# Patient Record
Sex: MALE | Race: WHITE | NOT HISPANIC OR LATINO | Employment: FULL TIME | URBAN - METROPOLITAN AREA
[De-identification: names, ages, dates, MRNs, and addresses within clinical notes are randomized per-mention and may not be internally consistent; named-entity substitution may affect disease eponyms.]

---

## 2017-02-14 ENCOUNTER — LAB (OUTPATIENT)
Dept: LAB | Facility: MEDICAL CENTER | Age: 32
End: 2017-02-14
Payer: COMMERCIAL

## 2017-02-14 ENCOUNTER — TRANSCRIBE ORDERS (OUTPATIENT)
Dept: ADMINISTRATIVE | Facility: HOSPITAL | Age: 32
End: 2017-02-14

## 2017-02-14 DIAGNOSIS — E03.9 HYPOTHYROIDISM, UNSPECIFIED TYPE: ICD-10-CM

## 2017-02-14 DIAGNOSIS — E03.9 HYPOTHYROIDISM, UNSPECIFIED TYPE: Primary | ICD-10-CM

## 2017-02-14 LAB
T3 SERPL-MCNC: 0.8 NG/ML (ref 0.6–1.8)
T4 FREE SERPL-MCNC: 1.21 NG/DL (ref 0.76–1.46)
T4 SERPL-MCNC: 11.2 UG/DL (ref 4.7–13.3)
TSH SERPL DL<=0.05 MIU/L-ACNC: 6.41 UIU/ML (ref 0.36–3.74)

## 2017-02-14 PROCEDURE — 84443 ASSAY THYROID STIM HORMONE: CPT

## 2017-02-14 PROCEDURE — 84480 ASSAY TRIIODOTHYRONINE (T3): CPT

## 2017-02-14 PROCEDURE — 36415 COLL VENOUS BLD VENIPUNCTURE: CPT

## 2017-02-14 PROCEDURE — 84439 ASSAY OF FREE THYROXINE: CPT

## 2017-03-22 ENCOUNTER — TRANSCRIBE ORDERS (OUTPATIENT)
Dept: ADMINISTRATIVE | Facility: HOSPITAL | Age: 32
End: 2017-03-22

## 2017-03-22 ENCOUNTER — APPOINTMENT (OUTPATIENT)
Dept: LAB | Facility: MEDICAL CENTER | Age: 32
End: 2017-03-22
Payer: COMMERCIAL

## 2017-03-22 DIAGNOSIS — E03.9 UNSPECIFIED HYPOTHYROIDISM: Primary | ICD-10-CM

## 2017-03-22 DIAGNOSIS — E10.21 DIABETIC NEPHROPATHY, TYPE I (HCC): ICD-10-CM

## 2017-03-22 DIAGNOSIS — E10.319: Primary | ICD-10-CM

## 2017-03-22 DIAGNOSIS — E10.319: ICD-10-CM

## 2017-03-22 DIAGNOSIS — E03.9 UNSPECIFIED HYPOTHYROIDISM: ICD-10-CM

## 2017-03-22 LAB
ALBUMIN SERPL BCP-MCNC: 3.9 G/DL (ref 3.5–5)
ALP SERPL-CCNC: 67 U/L (ref 46–116)
ALT SERPL W P-5'-P-CCNC: 81 U/L (ref 12–78)
ANION GAP SERPL CALCULATED.3IONS-SCNC: 6 MMOL/L (ref 4–13)
AST SERPL W P-5'-P-CCNC: 29 U/L (ref 5–45)
BASOPHILS # BLD AUTO: 0.03 THOUSANDS/ΜL (ref 0–0.1)
BASOPHILS NFR BLD AUTO: 0 % (ref 0–1)
BILIRUB SERPL-MCNC: 1.1 MG/DL (ref 0.2–1)
BUN SERPL-MCNC: 26 MG/DL (ref 5–25)
CALCIUM SERPL-MCNC: 9.5 MG/DL (ref 8.3–10.1)
CHLORIDE SERPL-SCNC: 106 MMOL/L (ref 100–108)
CO2 SERPL-SCNC: 30 MMOL/L (ref 21–32)
CREAT SERPL-MCNC: 1.05 MG/DL (ref 0.6–1.3)
CREAT UR-MCNC: 104 MG/DL
EOSINOPHIL # BLD AUTO: 0.17 THOUSAND/ΜL (ref 0–0.61)
EOSINOPHIL NFR BLD AUTO: 2 % (ref 0–6)
ERYTHROCYTE [DISTWIDTH] IN BLOOD BY AUTOMATED COUNT: 13.1 % (ref 11.6–15.1)
GFR SERPL CREATININE-BSD FRML MDRD: >60 ML/MIN/1.73SQ M
GLUCOSE P FAST SERPL-MCNC: 135 MG/DL (ref 65–99)
HCT VFR BLD AUTO: 40.8 % (ref 36.5–49.3)
HGB BLD-MCNC: 13.7 G/DL (ref 12–17)
LYMPHOCYTES # BLD AUTO: 2.42 THOUSANDS/ΜL (ref 0.6–4.47)
LYMPHOCYTES NFR BLD AUTO: 30 % (ref 14–44)
MAGNESIUM SERPL-MCNC: 2.4 MG/DL (ref 1.6–2.6)
MCH RBC QN AUTO: 29.8 PG (ref 26.8–34.3)
MCHC RBC AUTO-ENTMCNC: 33.6 G/DL (ref 31.4–37.4)
MCV RBC AUTO: 89 FL (ref 82–98)
MICROALBUMIN UR-MCNC: 94 MG/L (ref 0–20)
MICROALBUMIN/CREAT 24H UR: 90 MG/G CREATININE (ref 0–30)
MONOCYTES # BLD AUTO: 0.56 THOUSAND/ΜL (ref 0.17–1.22)
MONOCYTES NFR BLD AUTO: 7 % (ref 4–12)
NEUTROPHILS # BLD AUTO: 5 THOUSANDS/ΜL (ref 1.85–7.62)
NEUTS SEG NFR BLD AUTO: 61 % (ref 43–75)
NRBC BLD AUTO-RTO: 0 /100 WBCS
PHOSPHATE SERPL-MCNC: 3.2 MG/DL (ref 2.7–4.5)
PLATELET # BLD AUTO: 230 THOUSANDS/UL (ref 149–390)
PMV BLD AUTO: 11.8 FL (ref 8.9–12.7)
POTASSIUM SERPL-SCNC: 4.3 MMOL/L (ref 3.5–5.3)
PROT SERPL-MCNC: 7.7 G/DL (ref 6.4–8.2)
RBC # BLD AUTO: 4.59 MILLION/UL (ref 3.88–5.62)
SODIUM SERPL-SCNC: 142 MMOL/L (ref 136–145)
T4 FREE SERPL-MCNC: 1.49 NG/DL (ref 0.76–1.46)
TSH SERPL DL<=0.05 MIU/L-ACNC: 0.34 UIU/ML (ref 0.36–3.74)
URATE SERPL-MCNC: 5.4 MG/DL (ref 4.2–8)
WBC # BLD AUTO: 8.2 THOUSAND/UL (ref 4.31–10.16)

## 2017-03-22 PROCEDURE — 82043 UR ALBUMIN QUANTITATIVE: CPT | Performed by: INTERNAL MEDICINE

## 2017-03-22 PROCEDURE — 85025 COMPLETE CBC W/AUTO DIFF WBC: CPT

## 2017-03-22 PROCEDURE — 84443 ASSAY THYROID STIM HORMONE: CPT

## 2017-03-22 PROCEDURE — 83735 ASSAY OF MAGNESIUM: CPT

## 2017-03-22 PROCEDURE — 84100 ASSAY OF PHOSPHORUS: CPT

## 2017-03-22 PROCEDURE — 80053 COMPREHEN METABOLIC PANEL: CPT

## 2017-03-22 PROCEDURE — 84439 ASSAY OF FREE THYROXINE: CPT

## 2017-03-22 PROCEDURE — 84550 ASSAY OF BLOOD/URIC ACID: CPT

## 2017-03-22 PROCEDURE — 36415 COLL VENOUS BLD VENIPUNCTURE: CPT

## 2017-03-22 PROCEDURE — 82570 ASSAY OF URINE CREATININE: CPT | Performed by: INTERNAL MEDICINE

## 2018-09-16 ENCOUNTER — HOSPITAL ENCOUNTER (INPATIENT)
Facility: HOSPITAL | Age: 33
LOS: 1 days | Discharge: HOME | DRG: 919 | End: 2018-09-17
Attending: EMERGENCY MEDICINE | Admitting: INTERNAL MEDICINE
Payer: COMMERCIAL

## 2018-09-16 ENCOUNTER — APPOINTMENT (EMERGENCY)
Dept: RADIOLOGY | Facility: HOSPITAL | Age: 33
DRG: 919 | End: 2018-09-16
Payer: COMMERCIAL

## 2018-09-16 DIAGNOSIS — E87.2 METABOLIC ACIDOSIS, INCREASED ANION GAP: ICD-10-CM

## 2018-09-16 DIAGNOSIS — E11.10 DKA (DIABETIC KETOACIDOSES): Primary | ICD-10-CM

## 2018-09-16 DIAGNOSIS — N17.9 AKI (ACUTE KIDNEY INJURY) (HCC): ICD-10-CM

## 2018-09-16 PROBLEM — E03.9 HYPOTHYROID: Status: ACTIVE | Noted: 2018-09-16

## 2018-09-16 LAB
ACETONE SERPL-MCNC: ABNORMAL MG/DL
ALBUMIN SERPL BCP-MCNC: 3.8 G/DL (ref 3.5–5)
ALBUMIN SERPL BCP-MCNC: 4.6 G/DL (ref 3.5–5)
ALP SERPL-CCNC: 67 U/L (ref 46–116)
ALP SERPL-CCNC: 75 U/L (ref 46–116)
ALT SERPL W P-5'-P-CCNC: 24 U/L (ref 12–78)
ALT SERPL W P-5'-P-CCNC: 26 U/L (ref 12–78)
AMPHETAMINES SERPL QL SCN: NEGATIVE
ANION GAP SERPL CALCULATED.3IONS-SCNC: 13 MMOL/L (ref 4–13)
ANION GAP SERPL CALCULATED.3IONS-SCNC: 22 MMOL/L (ref 4–13)
APTT PPP: 31 SECONDS (ref 24–36)
AST SERPL W P-5'-P-CCNC: 18 U/L (ref 5–45)
AST SERPL W P-5'-P-CCNC: 21 U/L (ref 5–45)
ATRIAL RATE: 112 BPM
BACTERIA UR QL AUTO: ABNORMAL /HPF
BARBITURATES UR QL: NEGATIVE
BASE EX.OXY STD BLDV CALC-SCNC: 90 % (ref 60–80)
BASE EXCESS BLDV CALC-SCNC: -9 MMOL/L
BASOPHILS # BLD AUTO: 0.07 THOUSANDS/ΜL (ref 0–0.1)
BASOPHILS NFR BLD AUTO: 1 % (ref 0–1)
BENZODIAZ UR QL: NEGATIVE
BILIRUB SERPL-MCNC: 1.62 MG/DL (ref 0.2–1)
BILIRUB SERPL-MCNC: 2.12 MG/DL (ref 0.2–1)
BILIRUB UR QL STRIP: NEGATIVE
BUN SERPL-MCNC: 29 MG/DL (ref 5–25)
BUN SERPL-MCNC: 36 MG/DL (ref 5–25)
CALCIUM SERPL-MCNC: 8.5 MG/DL (ref 8.3–10.1)
CALCIUM SERPL-MCNC: 9.9 MG/DL (ref 8.3–10.1)
CHLORIDE SERPL-SCNC: 103 MMOL/L (ref 100–108)
CHLORIDE SERPL-SCNC: 90 MMOL/L (ref 100–108)
CLARITY UR: CLEAR
CO2 SERPL-SCNC: 19 MMOL/L (ref 21–32)
CO2 SERPL-SCNC: 22 MMOL/L (ref 21–32)
COCAINE UR QL: NEGATIVE
COLOR UR: YELLOW
COLOR, POC: YELLOW
CREAT SERPL-MCNC: 1.53 MG/DL (ref 0.6–1.3)
CREAT SERPL-MCNC: 1.9 MG/DL (ref 0.6–1.3)
EOSINOPHIL # BLD AUTO: 0.19 THOUSAND/ΜL (ref 0–0.61)
EOSINOPHIL NFR BLD AUTO: 2 % (ref 0–6)
ERYTHROCYTE [DISTWIDTH] IN BLOOD BY AUTOMATED COUNT: 13.1 % (ref 11.6–15.1)
GFR SERPL CREATININE-BSD FRML MDRD: 46 ML/MIN/1.73SQ M
GFR SERPL CREATININE-BSD FRML MDRD: 59 ML/MIN/1.73SQ M
GLUCOSE SERPL-MCNC: 115 MG/DL (ref 65–140)
GLUCOSE SERPL-MCNC: 123 MG/DL (ref 65–140)
GLUCOSE SERPL-MCNC: 130 MG/DL (ref 65–140)
GLUCOSE SERPL-MCNC: 195 MG/DL (ref 65–140)
GLUCOSE SERPL-MCNC: 223 MG/DL (ref 65–140)
GLUCOSE SERPL-MCNC: 245 MG/DL (ref 65–140)
GLUCOSE SERPL-MCNC: 297 MG/DL (ref 65–140)
GLUCOSE SERPL-MCNC: 312 MG/DL (ref 65–140)
GLUCOSE SERPL-MCNC: 356 MG/DL (ref 65–140)
GLUCOSE SERPL-MCNC: 442 MG/DL (ref 65–140)
GLUCOSE SERPL-MCNC: 588 MG/DL (ref 65–140)
GLUCOSE SERPL-MCNC: >500 MG/DL (ref 65–140)
GLUCOSE SERPL-MCNC: >500 MG/DL (ref 65–140)
GLUCOSE UR STRIP-MCNC: ABNORMAL MG/DL
HCO3 BLDV-SCNC: 16.1 MMOL/L (ref 24–30)
HCT VFR BLD AUTO: 41.2 % (ref 36.5–49.3)
HGB BLD-MCNC: 13.2 G/DL (ref 12–17)
HGB UR QL STRIP.AUTO: ABNORMAL
IMM GRANULOCYTES # BLD AUTO: 0.04 THOUSAND/UL (ref 0–0.2)
IMM GRANULOCYTES NFR BLD AUTO: 0 % (ref 0–2)
INR PPP: 1.03 (ref 0.86–1.17)
KETONES UR STRIP-MCNC: ABNORMAL MG/DL
LEUKOCYTE ESTERASE UR QL STRIP: NEGATIVE
LYMPHOCYTES # BLD AUTO: 2.08 THOUSANDS/ΜL (ref 0.6–4.47)
LYMPHOCYTES NFR BLD AUTO: 18 % (ref 14–44)
MAGNESIUM SERPL-MCNC: 2.2 MG/DL (ref 1.6–2.6)
MAGNESIUM SERPL-MCNC: 2.4 MG/DL (ref 1.6–2.6)
MCH RBC QN AUTO: 30 PG (ref 26.8–34.3)
MCHC RBC AUTO-ENTMCNC: 32 G/DL (ref 31.4–37.4)
MCV RBC AUTO: 94 FL (ref 82–98)
METHADONE UR QL: NEGATIVE
MONOCYTES # BLD AUTO: 0.83 THOUSAND/ΜL (ref 0.17–1.22)
MONOCYTES NFR BLD AUTO: 7 % (ref 4–12)
MUCOUS THREADS UR QL AUTO: ABNORMAL
NEUTROPHILS # BLD AUTO: 8.61 THOUSANDS/ΜL (ref 1.85–7.62)
NEUTS SEG NFR BLD AUTO: 72 % (ref 43–75)
NITRITE UR QL STRIP: NEGATIVE
NON-SQ EPI CELLS URNS QL MICRO: ABNORMAL /HPF
NRBC BLD AUTO-RTO: 0 /100 WBCS
O2 CT BLDV-SCNC: 17.7 ML/DL
OPIATES UR QL SCN: NEGATIVE
P AXIS: 72 DEGREES
PCO2 BLDV: 32.7 MM HG (ref 42–50)
PCP UR QL: NEGATIVE
PH BLDV: 7.31 [PH] (ref 7.3–7.4)
PH UR STRIP.AUTO: 5.5 [PH] (ref 4.5–8)
PHOSPHATE SERPL-MCNC: 5.9 MG/DL (ref 2.7–4.5)
PLATELET # BLD AUTO: 209 THOUSANDS/UL (ref 149–390)
PLATELET # BLD AUTO: 220 THOUSANDS/UL (ref 149–390)
PMV BLD AUTO: 10.5 FL (ref 8.9–12.7)
PMV BLD AUTO: 11.8 FL (ref 8.9–12.7)
PO2 BLDV: 64.5 MM HG (ref 35–45)
POTASSIUM SERPL-SCNC: 3.8 MMOL/L (ref 3.5–5.3)
POTASSIUM SERPL-SCNC: 5 MMOL/L (ref 3.5–5.3)
PR INTERVAL: 130 MS
PROT SERPL-MCNC: 7.3 G/DL (ref 6.4–8.2)
PROT SERPL-MCNC: 8.3 G/DL (ref 6.4–8.2)
PROT UR STRIP-MCNC: NEGATIVE MG/DL
PROTHROMBIN TIME: 13.6 SECONDS (ref 11.8–14.2)
QRS AXIS: -24 DEGREES
QRSD INTERVAL: 100 MS
QT INTERVAL: 342 MS
QTC INTERVAL: 466 MS
RBC # BLD AUTO: 4.4 MILLION/UL (ref 3.88–5.62)
RBC #/AREA URNS AUTO: ABNORMAL /HPF
S PYO AG THROAT QL: NEGATIVE
SODIUM SERPL-SCNC: 131 MMOL/L (ref 136–145)
SODIUM SERPL-SCNC: 138 MMOL/L (ref 136–145)
SP GR UR STRIP.AUTO: 1.01 (ref 1–1.03)
T WAVE AXIS: 62 DEGREES
THC UR QL: POSITIVE
TROPONIN I SERPL-MCNC: <0.02 NG/ML
UROBILINOGEN UR QL STRIP.AUTO: 0.2 E.U./DL
VENTRICULAR RATE: 112 BPM
WBC # BLD AUTO: 11.82 THOUSAND/UL (ref 4.31–10.16)
WBC #/AREA URNS AUTO: ABNORMAL /HPF

## 2018-09-16 PROCEDURE — 82805 BLOOD GASES W/O2 SATURATION: CPT | Performed by: EMERGENCY MEDICINE

## 2018-09-16 PROCEDURE — 80307 DRUG TEST PRSMV CHEM ANLYZR: CPT | Performed by: EMERGENCY MEDICINE

## 2018-09-16 PROCEDURE — 84100 ASSAY OF PHOSPHORUS: CPT | Performed by: EMERGENCY MEDICINE

## 2018-09-16 PROCEDURE — 82009 KETONE BODYS QUAL: CPT | Performed by: EMERGENCY MEDICINE

## 2018-09-16 PROCEDURE — 71046 X-RAY EXAM CHEST 2 VIEWS: CPT

## 2018-09-16 PROCEDURE — 83735 ASSAY OF MAGNESIUM: CPT | Performed by: NURSE PRACTITIONER

## 2018-09-16 PROCEDURE — 80053 COMPREHEN METABOLIC PANEL: CPT | Performed by: EMERGENCY MEDICINE

## 2018-09-16 PROCEDURE — 83735 ASSAY OF MAGNESIUM: CPT | Performed by: EMERGENCY MEDICINE

## 2018-09-16 PROCEDURE — 80053 COMPREHEN METABOLIC PANEL: CPT | Performed by: NURSE PRACTITIONER

## 2018-09-16 PROCEDURE — 85730 THROMBOPLASTIN TIME PARTIAL: CPT | Performed by: EMERGENCY MEDICINE

## 2018-09-16 PROCEDURE — 82948 REAGENT STRIP/BLOOD GLUCOSE: CPT

## 2018-09-16 PROCEDURE — 93010 ELECTROCARDIOGRAM REPORT: CPT | Performed by: INTERNAL MEDICINE

## 2018-09-16 PROCEDURE — 85025 COMPLETE CBC W/AUTO DIFF WBC: CPT | Performed by: EMERGENCY MEDICINE

## 2018-09-16 PROCEDURE — 85610 PROTHROMBIN TIME: CPT | Performed by: EMERGENCY MEDICINE

## 2018-09-16 PROCEDURE — 81001 URINALYSIS AUTO W/SCOPE: CPT

## 2018-09-16 PROCEDURE — 84484 ASSAY OF TROPONIN QUANT: CPT | Performed by: EMERGENCY MEDICINE

## 2018-09-16 PROCEDURE — 36415 COLL VENOUS BLD VENIPUNCTURE: CPT | Performed by: EMERGENCY MEDICINE

## 2018-09-16 PROCEDURE — 85049 AUTOMATED PLATELET COUNT: CPT | Performed by: NURSE PRACTITIONER

## 2018-09-16 PROCEDURE — 93005 ELECTROCARDIOGRAM TRACING: CPT

## 2018-09-16 PROCEDURE — 87430 STREP A AG IA: CPT | Performed by: EMERGENCY MEDICINE

## 2018-09-16 PROCEDURE — 96360 HYDRATION IV INFUSION INIT: CPT

## 2018-09-16 PROCEDURE — 99221 1ST HOSP IP/OBS SF/LOW 40: CPT | Performed by: INTERNAL MEDICINE

## 2018-09-16 RX ORDER — SODIUM CHLORIDE 9 MG/ML
250 INJECTION, SOLUTION INTRAVENOUS CONTINUOUS
Status: DISCONTINUED | OUTPATIENT
Start: 2018-09-16 | End: 2018-09-17

## 2018-09-16 RX ORDER — SODIUM CHLORIDE 9 MG/ML
500 INJECTION, SOLUTION INTRAVENOUS CONTINUOUS
Status: DISPENSED | OUTPATIENT
Start: 2018-09-16 | End: 2018-09-16

## 2018-09-16 RX ORDER — CHLORHEXIDINE GLUCONATE 0.12 MG/ML
15 RINSE ORAL EVERY 12 HOURS SCHEDULED
Status: DISCONTINUED | OUTPATIENT
Start: 2018-09-16 | End: 2018-09-17

## 2018-09-16 RX ORDER — SODIUM CHLORIDE 9 MG/ML
1000 INJECTION, SOLUTION INTRAVENOUS CONTINUOUS
Status: DISPENSED | OUTPATIENT
Start: 2018-09-16 | End: 2018-09-16

## 2018-09-16 RX ORDER — DEXTROSE AND SODIUM CHLORIDE 5; .45 G/100ML; G/100ML
250 INJECTION, SOLUTION INTRAVENOUS CONTINUOUS
Status: DISCONTINUED | OUTPATIENT
Start: 2018-09-16 | End: 2018-09-17

## 2018-09-16 RX ORDER — LEVOTHYROXINE SODIUM 88 UG/1
TABLET ORAL
COMMUNITY
Start: 2017-09-15 | End: 2018-12-24 | Stop reason: SDUPTHER

## 2018-09-16 RX ORDER — LEVOTHYROXINE SODIUM 88 UG/1
88 TABLET ORAL
Status: DISCONTINUED | OUTPATIENT
Start: 2018-09-16 | End: 2018-09-17 | Stop reason: HOSPADM

## 2018-09-16 RX ADMIN — SODIUM CHLORIDE 6 UNITS/HR: 9 INJECTION, SOLUTION INTRAVENOUS at 14:28

## 2018-09-16 RX ADMIN — SODIUM CHLORIDE 1000 ML: 0.9 INJECTION, SOLUTION INTRAVENOUS at 13:37

## 2018-09-16 RX ADMIN — ENOXAPARIN SODIUM 40 MG: 40 INJECTION SUBCUTANEOUS at 17:20

## 2018-09-16 RX ADMIN — CHLORHEXIDINE GLUCONATE 0.12% ORAL RINSE 15 ML: 1.2 LIQUID ORAL at 21:14

## 2018-09-16 RX ADMIN — DEXTROSE AND SODIUM CHLORIDE 250 ML/HR: 5; .45 INJECTION, SOLUTION INTRAVENOUS at 18:47

## 2018-09-16 RX ADMIN — LEVOTHYROXINE SODIUM 88 MCG: 88 TABLET ORAL at 17:26

## 2018-09-16 RX ADMIN — SODIUM CHLORIDE 250 ML/HR: 0.9 INJECTION, SOLUTION INTRAVENOUS at 16:37

## 2018-09-16 NOTE — ED NOTES
Discussed pt's CMP results with CRNP Ed, states to continue with plan of care        Dalton Saucedo RN  09/16/18 5058

## 2018-09-16 NOTE — Clinical Note
Case was discussed with Critical Care (Ed) and the patient's admission status was agreed to be Admission Status: inpatient status to the service of Dr Blake August

## 2018-09-16 NOTE — ED NOTES
Pt's current blood glucose 245  IV changed as charted in MAR, No change to INSULIN gtt rate at this time as per DKA protocol       Jhoana Cardoza RN  09/16/18 9644

## 2018-09-16 NOTE — H&P
History & Physical Exam - 1914 Shun Ramesh 29 y o  male MRN: [id number]  Unit/Bed#: ED 15 Encounter: [id number]      Assessment/Plan:  1  Diabetic ketoacidosis  · Discontinue patient's pump start IV insulin  · Fluid resuscitation  · Consult endocrine  2  Hypothyroid  · Continue Synthroid  3  Acute kidney injury likely secondary to dehydration  · Monitor kidney indices  · Fluid resuscitation        Critical Care Time:   Documented critical care time excludes any procedures documented elsewhere  It also excludes any family updates    _____________________________________________________________________      HPI:    Breanna Vazquez is a 29 y o  male with a known history of type 1 diabetes mellitus  Has been under great deal of stress lately with his [employer] business  He has recently began to have palpitations, nausea, cough productive of green phlegm only in the morning  He further complains of decreased appetite and states that he vomited in the x-ray department today  He denies complaints of lightheadedness or dizziness, neck pain, diarrhea, lower extremity edema  He will be admitted to the step-down portion of the intensive care unit  He will require greater than 2 midnight stay  Endocrinology will be consulted on his behalf  Patient normally follows with Dr Damian Rabago, endocrinologist at [facility]  Review of Systems:    Full 14 point review of systems was performed  Aside from what was mentioned in the HPI, it is otherwise negative  Historical Information   Past Medical History:   Diagnosis Date    Diabetes mellitus ([address] )     Disease of thyroid gland      No past surgical history on file  Social History   History   Alcohol Use No     History   Drug Use    Types: Marijuana     History   Smoking Status    Never Smoker   Smokeless Tobacco    Never Used       Family History:   No family history on file      Medications:  Pertinent medications were reviewed    Current Facility-Administered Medications:  chlorhexidine 15 mL [facility]    dextrose 5 % and sodium chloride 0 45 % 250 mL/hr Intravenous Continuous Barb Pena MD    enoxaparin 40 mg Subcutaneous Daily LAZARO Mckeon    insulin regular (HumuLIN R,NovoLIN R) infusion 6 Units/hr Intravenous Continuous Barb Pena MD Last Rate: 6 Units/hr (09/16/18 1428)   levothyroxine 88 mcg Oral Daily LAZARO Mckeon    sodium chloride 1,000 mL Intravenous Once Barb Pena MD    sodium chloride 1,000 mL Intravenous Once Barb Pena MD Last Rate: 1,000 mL (09/16/18 1337)   sodium chloride 500 mL/hr Intravenous Continuous Barb Pena MD    Followed by        sodium chloride 250 mL/hr Intravenous Continuous Barb Pena MD          No Known Allergies      Vitals:   /58 (BP Location: Right arm)   Pulse 104   Temp 98 °F (36 7 °C) (Oral)   Resp 18   Wt 61 2 kg (135 lb)   SpO2 99%   There is no height or weight on file to calculate BMI    SpO2: 99 %,   SpO2 Activity: At Rest,   O2 Device: None (Room air)      Intake/Output Summary (Last 24 hours) at 09/16/18 1531  Last data filed at 09/16/18 1337   Gross per 24 hour   Intake             1000 ml   Output                0 ml   Net             1000 ml     Invasive Devices     Peripheral Intravenous Line            Peripheral IV 09/16/18 Left Antecubital less than 1 day    Peripheral IV 09/16/18 Left Hand less than 1 day                Physical Exam:  Gen:  Pleasant and cooperative  HEENT:  Atraumatic normocephalic pupils equal round reactive to light extraocular muscles intact sclerae anicteric oral mucosa is pink and dry  Neck:  Supple no JVD no lymphadenopathy trachea midline  Chest:  Clear to auscultation bilaterally respirations even nonlabored  Cor:  Regular rate and rhythm no murmurs rubs or gallops appreciated  Abd:  Soft nontender with positive bowel sounds  Ext:  No clubbing cyanosis or edema  Neuro:  Alert and oriented x3 moves all extremities  Skin:  Warm dry and intact no rash      Diagnostic Data:  Lab: I have personally reviewed pertinent lab results  ,   CBC:    Results from last 7 days  Lab Units 09/16/18  1309   WBC Thousand/uL 11 82*   HEMOGLOBIN g/dL 13 2   HEMATOCRIT % 41 2   PLATELETS Thousands/uL 220      CMP: Lab Results   Component Value Date     (L) 09/16/2018    K 5 0 09/16/2018    CL 90 (L) 09/16/2018    CO2 19 (L) 09/16/2018    BUN 36 (H) 09/16/2018    CREATININE 1 90 (H) 09/16/2018    CALCIUM 9 9 09/16/2018    AST 21 09/16/2018    ALT 24 09/16/2018    ALKPHOS 75 09/16/2018    EGFR 46 09/16/2018   ,   PT/INR:   Lab Results   Component Value Date    INR 1 03 09/16/2018   ,   Magnesium: No results found for: MAG,  Phosphorous: No results found for: PHOS    ABG: No results found for: PHART, POO9TXX, PO2ART, ZSL1KDK, W8SIAGZL, BEART, SOURCE,     Microbiology:  No pending microbiology    Imaging: I have personally reviewed the pertinent imaging studies on the PACS system  Chest x-ray demonstrates no acute cardiopulmonary disease    Cardiac/EKG/telemetry/Echo:     Sinus tachycardia      VTE Prophylaxis: Sequential compression device (Venodyne)  and Enoxaparin (Lovenox)    Code Status: Level 1 - Full Code    LAZARO Craig    Portions of the record may have been created with voice recognition software  Occasional wrong word or "sound a like" substitutions may have occurred due to the inherent limitations of voice recognition software  Read the chart carefully and recognize, using context, where substitutions have occurred

## 2018-09-16 NOTE — ED NOTES
Pt aware of NPO status  Pt given mouthwash, swish and spit observed       Sherryle Court, RN  09/16/18 1062

## 2018-09-16 NOTE — ED NOTES
Pt returned from XR to ED 15  Per XR tech pt vomited large amount of brown, liquid emesis  Pt reports that he had coffee and cider prior to arrival in ED  Pt given emesis bag  Pt states that he will not be able to provide urine sample at this time and will attempt to give a sample at 1400       Alverto Luis RN  09/16/18 3124

## 2018-09-16 NOTE — ED PROVIDER NOTES
History  Chief Complaint   Patient presents with    Palpitations     Patient reports beginning with palpitations at 8am this morning  patient also c/o chest pain, difficulty with deep inspiration, and upper back pain  Denies cardiac hx  Hx of type 1 diabetes  BS controlled with insulin  History provided by:  Patient   used: No    Medical Problem   Severity:  Moderate  Onset quality:  Gradual  Duration:  2 days  Timing:  Intermittent  Progression:  Worsening  Chronicity:  New  Context:  DM Type 1, Pump-Sensor  per the patient, has been feeling sick for the past couple of days, complains of cough, green phlegm, decreased appetite, nausea, chest discomfort  Relieved by:  Nothing  Worsened by:  None  Ineffective treatments:  None  Associated symptoms: chest pain and cough    Associated symptoms: no abdominal pain, no diarrhea, no fever, no headaches, no loss of consciousness, no nausea, no rash, no shortness of breath, no sore throat, no vomiting and no wheezing    Chest pain:     Quality: aching      Severity:  Mild    Onset quality:  Gradual    Duration:  2 days    Timing:  Intermittent    Progression:  Waxing and waning    Chronicity:  New  Cough:     Cough characteristics:  Productive    Sputum characteristics:  Green    Severity:  Mild    Onset quality:  Gradual    Duration:  2 days    Timing:  Intermittent    Progression:  Waxing and waning    Chronicity:  New  Risk factors:  Diabetes type 1, hypothyroidism      Prior to Admission Medications   Prescriptions Last Dose Informant Patient Reported? Taking?   insulin aspart (NovoLOG) 100 units/mL injection   Yes Yes   Sig: Inject 75 units daily via insulin pump   6 vials = 90 day suply   E10 311   levothyroxine 88 mcg tablet   Yes Yes   Sig: Take 1 tab daily      Facility-Administered Medications: None       Past Medical History:   Diagnosis Date    Diabetes mellitus [facility] [address] )     Disease of thyroid gland        No past surgical history on file  No family history on file  I have reviewed and agree with the history as documented  Social History   Substance Use Topics    Smoking status: Never Smoker    Smokeless tobacco: Never Used    Alcohol use No        Review of Systems   Constitutional: Negative for activity change, chills and fever  HENT: Negative for facial swelling, sore throat and trouble swallowing  Eyes: Negative for pain and visual disturbance  Respiratory: Positive for cough  Negative for chest tightness, shortness of breath and wheezing  Cardiovascular: Positive for chest pain  Negative for leg swelling  Gastrointestinal: Negative for abdominal pain, blood in stool, diarrhea, nausea and vomiting  Genitourinary: Negative for dysuria and flank pain  Musculoskeletal: Negative for back pain, neck pain and neck stiffness  Skin: Negative for pallor and rash  Allergic/Immunologic: Negative for environmental allergies and immunocompromised state  Neurological: Negative for dizziness, loss of consciousness and headaches  Hematological: Negative for adenopathy  Does not bruise/bleed easily  Psychiatric/Behavioral: Negative for agitation and behavioral problems  All other systems reviewed and are negative  Physical Exam  Physical Exam   Constitutional: He is oriented to person, place, and time  He appears well-developed and well-nourished  No distress  HENT:   Head: Normocephalic and atraumatic  Eyes: EOM are normal    Neck: Normal range of motion  Neck supple  Cardiovascular: Normal rate, regular rhythm, normal heart sounds and intact distal pulses  Pulmonary/Chest: Effort normal and breath sounds normal    Abdominal: Soft  Bowel sounds are normal  There is no tenderness  There is no rebound and no guarding  Musculoskeletal: Normal range of motion  Neurological: He is alert and oriented to person, place, and time  Skin: Skin is warm and dry  Psychiatric: He has a normal mood and affect  Nursing note and vitals reviewed        Vital Signs  ED Triage Vitals [09/16/18 1210]   Temperature Pulse Respirations Blood Pressure SpO2   98 °F (36 7 °C) (!) 116 22 136/63 98 %      Temp Source Heart Rate Source Patient Position - Orthostatic VS BP Location FiO2 (%)   Oral Monitor Lying Right arm --      Pain Score       7           Vitals:    09/16/18 1515 09/16/18 1545 09/16/18 1615 09/16/18 1715   BP: 125/64 122/76 108/60 136/77   Pulse: 100 96 94 96   Patient Position - Orthostatic VS: Lying Lying Lying Lying       Visual Acuity  Visual Acuity      Most Recent Value   L Pupil Size (mm)  3   R Pupil Size (mm)  3          ED Medications  Medications   sodium chloride 0 9 % bolus 1,000 mL (1,000 mL Intravenous Not Given 9/16/18 1422)   sodium chloride 0 9 % infusion ( Intravenous Canceled Entry 9/16/18 1423)     Followed by   sodium chloride 0 9 % infusion ( Intravenous Canceled Entry 9/16/18 1423)     Followed by   sodium chloride 0 9 % infusion (250 mL/hr Intravenous New Bag 9/16/18 1637)   dextrose 5 % and sodium chloride 0 45 % infusion (not administered)   insulin regular (HumuLIN R,NovoLIN R) 1 Units/mL in sodium chloride 0 9 % 100 mL infusion (6 Units/hr Intravenous Rate/Dose Change 9/16/18 1736)   chlorhexidine (PERIDEX) 0 12 % oral rinse 15 mL (not administered)   enoxaparin (LOVENOX) subcutaneous injection 40 mg (40 mg Subcutaneous Given 9/16/18 1720)   levothyroxine tablet 88 mcg (88 mcg Oral Given 9/16/18 1726)   sodium chloride 0 9 % bolus 1,000 mL (0 mL Intravenous Stopped 9/16/18 1437)       Diagnostic Studies  Results Reviewed     Procedure Component Value Units Date/Time    Magnesium [[id number]]  (Normal) Collected:  09/16/18 1749    Lab Status:  Final result Specimen:  Blood from Arm, Right Updated:  09/16/18 1815     Magnesium 2 2 mg/dL     Platelet count [[id number]]  (Normal) Collected:  09/16/18 1749    Lab Status:  Final result Specimen:  Blood from Arm, Right Updated:  09/16/18 9145 Platelets 348 Thousands/uL      MPV 10 5 fL     Comprehensive metabolic panel [[id number]] Collected:  09/16/18 1749    Lab Status: In process Specimen:  Blood from Arm, Right Updated:  09/16/18 1752    Fingerstick Glucose (POCT) [[id number]]  (Abnormal) Collected:  09/16/18 1729    Lab Status:  Final result Updated:  09/16/18 1731     POC Glucose 312 (H) mg/dl     Magnesium [[id number]]  (Normal) Collected:  09/16/18 1309    Lab Status:  Final result Specimen:  Blood from Arm, Left Updated:  09/16/18 1704     Magnesium 2 4 mg/dL     Phosphorus [[id number]]  (Abnormal) Collected:  09/16/18 1309    Lab Status:  Final result Specimen:  Blood from Arm, Left Updated:  09/16/18 1704     Phosphorus 5 9 (H) mg/dL     Fingerstick Glucose (POCT) [[id number]]  (Abnormal) Collected:  09/16/18 1631    Lab Status:  Final result Updated:  09/16/18 1632     POC Glucose 356 (H) mg/dl     Fingerstick Glucose (POCT) [[id number]]  (Abnormal) Collected:  09/16/18 1533    Lab Status:  Final result Updated:  09/16/18 1535     POC Glucose 442 (H) mg/dl     Rapid drug screen, urine [[id number]]  (Abnormal) Collected:  09/16/18 1403    Lab Status:  Final result Specimen:  Urine from Urine, Clean Catch Updated:  09/16/18 1452     Amph/Meth UR Negative     Barbiturate Ur Negative     Benzodiazepine Urine Negative     Cocaine Urine Negative     Methadone Urine Negative     Opiate Urine Negative     PCP Ur Negative     THC Urine Positive (A)    Narrative:         Presumptive report  If requested, specimen will be sent to reference lab for confirmation  FOR MEDICAL PURPOSES ONLY  IF CONFIRMATION NEEDED PLEASE CONTACT THE LAB WITHIN 5 DAYS      Drug Screen Cutoff Levels:  AMPHETAMINE/METHAMPHETAMINES  1000 ng/mL  BARBITURATES     200 ng/mL  BENZODIAZEPINES     200 ng/mL  COCAINE      300 ng/mL  METHADONE      300 ng/mL  OPIATES      300 ng/mL  PHENCYCLIDINE     25 ng/mL  THC       50 ng/mL    Urine Microscopic [[id number]]  (Abnormal) Collected:  09/16/18 1408    Lab Status:  Final result Specimen:  Urine from Urine, Clean Catch Updated:  09/16/18 1435     RBC, UA 0-1 (A) /hpf      WBC, UA None Seen /hpf      Epithelial Cells Occasional /hpf      Bacteria, UA Occasional /hpf      MUCOUS THREADS Occasional (A)    Fingerstick Glucose (POCT) [[id number]]  (Abnormal) Collected:  09/16/18 1429    Lab Status:  Final result Updated:  09/16/18 1430     POC Glucose >500 (HH) mg/dl     POCT urinalysis dipstick [[id number]]  (Normal) Resulted:  09/16/18 1421    Lab Status:  Final result Specimen:  Urine Updated:  09/16/18 1421     Color, UA yellow    Acetone [[id number]]  (Abnormal) Collected:  09/16/18 1344    Lab Status:  Final result Specimen:  Blood from Arm, Right Updated:  09/16/18 1420     Acetone, Bld 2+ (A)    Rapid Strep A Screen Only, Adults [[id number]]  (Normal) Collected:  09/16/18 1357    Lab Status:  Final result Specimen:  Throat from Throat Updated:  09/16/18 1419     Rapid Strep A Screen Negative    ED Urine Macroscopic [[id number]]  (Abnormal) Collected:  09/16/18 1418    Lab Status:  Final result Specimen:  Urine Updated:  09/16/18 1408     Color, UA Yellow     Clarity, UA Clear     pH, UA 5 5     Leukocytes, UA Negative     Nitrite, UA Negative     Protein, UA Negative mg/dl      Glucose,  (1/2%) (A) mg/dl      Ketones, UA >=160 (4+) (A) mg/dl      Urobilinogen, UA 0 2 E U /dl      Bilirubin, UA Negative     Blood, UA Trace (A)     Specific [place], UA 1 015    Narrative:       CLINITEK RESULT    Blood gas, venous [[id number]]  (Abnormal) Collected:  09/16/18 1344    Lab Status:  Final result Specimen:  Blood from Arm, Right Updated:  09/16/18 1352     pH, Cesar 7 311     pCO2, Cesar 32 7 (L) mm Hg      pO2, Cesar 64 5 (H) mm Hg      HCO3, Cesar 16 1 (L) mmol/L      Base Excess, Cesar -9 0 mmol/L      O2 Content, Cesar 17 7 ml/dL      O2 HGB, VENOUS 90 0 (H) %     Narrative:        Therapeutic levels (1 mg/mL and 2 mg/mL) of hydroxocobalamin may interfere with the fCOHb and fMetHb where it may cause lower than expected values    Troponin I [[id number]]  (Normal) Collected:  09/16/18 1309    Lab Status:  Final result Specimen:  Blood from Arm, Left Updated:  09/16/18 1333     Troponin I <0 02 ng/mL     Comprehensive metabolic panel [[id number]]  (Abnormal) Collected:  09/16/18 1309    Lab Status:  Final result Specimen:  Blood from Arm, Left Updated:  09/16/18 1333     Sodium 131 (L) mmol/L      Potassium 5 0 mmol/L      Chloride 90 (L) mmol/L      CO2 19 (L) mmol/L      ANION GAP 22 (H) mmol/L      BUN 36 (H) mg/dL      Creatinine 1 90 (H) mg/dL      Glucose 588 (HH) mg/dL      Calcium 9 9 mg/dL      AST 21 U/L      ALT 24 U/L      Alkaline Phosphatase 75 U/L      Total Protein 8 3 (H) g/dL      Albumin 4 6 g/dL      Total Bilirubin 2 12 (H) mg/dL      eGFR 46 ml/min/1 73sq m     Narrative:         National Kidney Disease Education Program recommendations are as follows:  GFR calculation is accurate only with a steady state creatinine  Chronic Kidney disease less than 60 ml/min/1 73 sq  meters  Kidney failure less than 15 ml/min/1 73 sq  meters      Protime-INR [[id number]]  (Normal) Collected:  09/16/18 1309    Lab Status:  Final result Specimen:  Blood from Arm, Left Updated:  09/16/18 1326     Protime 13 6 seconds      INR 1 03    APTT [[id number]]  (Normal) Collected:  09/16/18 1309    Lab Status:  Final result Specimen:  Blood from Arm, Left Updated:  09/16/18 1326     PTT 31 seconds     CBC and differential [[id number]]  (Abnormal) Collected:  09/16/18 1309    Lab Status:  Final result Specimen:  Blood from Arm, Left Updated:  09/16/18 1318     WBC 11 82 (H) Thousand/uL      RBC 4 40 Million/uL      Hemoglobin 13 2 g/dL      Hematocrit 41 2 %      MCV 94 fL      MCH 30 0 pg      MCHC 32 0 g/dL      RDW 13 1 %      MPV 11 8 fL      Platelets 441 Thousands/uL      nRBC 0 /100 WBCs      Neutrophils Relative 72 %      Immat GRANS % 0 %      Lymphocytes Relative 18 %      Monocytes Relative 7 % Eosinophils Relative 2 %      Basophils Relative 1 %      Neutrophils Absolute 8 61 (H) Thousands/µL      Immature Grans Absolute 0 04 Thousand/uL      Lymphocytes Absolute 2 08 Thousands/µL      Monocytes Absolute 0 83 Thousand/µL      Eosinophils Absolute 0 19 Thousand/µL      Basophils Absolute 0 07 Thousands/µL     Fingerstick Glucose (POCT) [[id number]]  (Abnormal) Collected:  09/16/18 1312    Lab Status:  Final result Updated:  09/16/18 1313     POC Glucose >500 (HH) mg/dl                  XR chest 2 views   Final Result by Maxx Frederick MD (09/16 1503)      No acute cardiopulmonary disease              Workstation performed: [id number]                    Procedures  ECG 12 Lead Documentation  Date/Time: 9/16/2018 2:18 PM  Performed by: Wen Mosley  Authorized by: Wen Mosley     Indications / Diagnosis:  Chest pain  ECG reviewed by me, the ED Provider: yes    Patient location:  ED  Interpretation:     Interpretation: normal    Rate:     ECG rate:  115    ECG rate assessment: tachycardic    Rhythm:     Rhythm: sinus tachycardia    Ectopy:     Ectopy: none    QRS:     QRS axis:  Normal  Conduction:     Conduction: normal    ST segments:     ST segments:  Normal  T waves:     T waves: normal    CriticalCare Time  Performed by: Rivas Fraser by: Wen Mosley     Critical care provider statement:     Critical care time (minutes):  30    Critical care time was exclusive of:  Separately billable procedures and treating other patients and teaching time    Critical care was necessary to treat or prevent imminent or life-threatening deterioration of the following conditions:  Metabolic crisis (DKA)    Critical care was time spent personally by me on the following activities:  Blood draw for specimens, obtaining history from patient or surrogate, development of treatment plan with patient or surrogate, discussions with consultants, evaluation of patient's response to treatment, examination of patient, interpretation of cardiac output measurements, ordering and performing treatments and interventions, ordering and review of laboratory studies, ordering and review of radiographic studies, re-evaluation of patient's condition and review of old charts    I assumed direction of critical care for this patient from another provider in my specialty: no             Phone Contacts  ED Phone Contact    ED Course  ED Course as of Sep 16 1832   Sun Sep 16, 2018   1336 Glucose: (!!) 588   1336 Creatinine: (!) 1 90   1336 Anion Gap: (!) 22   1336 Labs reviewed, hyperglycemia, acute kidney injury, elevated anion gap, acidosis on CMP, we will start on insulin drip, 2 L normal saline ordered  CO2: (!) 19   1438 Acetone, Bld: (!) 2+   1438 Acetone 2 plus and VBG noted  Case was discussed with critical care, agree with admission to step-down  pH, Cesar: 7 311                               MDM  Number of Diagnoses or Management Options  SALO (acute kidney injury) ([facility] [address] ): new and requires workup  DKA (diabetic ketoacidoses) ([facility] [address] ): new and requires workup  Metabolic acidosis, increased anion gap: new and requires workup  Diagnosis management comments: Patient is a 81-year-old male, history of diabetes type 1, comes in with complaints of nausea, cough, green phlegm, chest pain, decreased appetite, under stress as he is preparing for an event, he reports that his insulin pump sensor stopped working today  On exam no acute distress:  Vital signs are noted for mild tachycardia, afebrile, throat appears mildly congested, no uvular deviation or peritonsillar swelling, we lungs clear, cardiovascular normal heart sounds, abdomen soft nontender  Fingerstick glucose shows more than 500  Impression: DKA  Will check labs including CBC, CMP, acetone, VBG, EKG, troponin, chest x-ray, give IV fluids         Amount and/or Complexity of Data Reviewed  Clinical lab tests: reviewed and ordered  Tests in the radiology section of CPT®: ordered and reviewed  Tests in the medicine section of CPT®: ordered and reviewed  Discuss the patient with other providers: yes  Independent visualization of images, tracings, or specimens: yes      The patient presented with a condition in which there was a high probability of imminent or life-threatening deterioration, and critical care services (excluding separately billable procedures) totalled 30-74 minutes  Disposition  Final diagnoses:   DKA (diabetic ketoacidoses) ([facility] [address] )   Metabolic acidosis, increased anion gap   SALO (acute kidney injury) ([facility] )     Time reflects when diagnosis was documented in both MDM as applicable and the Disposition within this note     Time User Action Codes Description Comment    9/16/2018  2:19 PM Shane Alpers Add [E13 10] [facility]     9/16/2018  2:25 PM Shane Alpers Add [R54 1] Metabolic acidosis, increased anion gap     9/16/2018  2:25 PM Shane Alpers Add [N17 9] SALO (acute kidney injury) [facility])       ED Disposition     ED Disposition Condition Comment    Admit  Case was discussed with Critical Care (Ed) and the patient's admission status was agreed to be Admission Status: inpatient status to the service of Dr Preet Mendoza  Follow-up Information    None         Patient's Medications   Discharge Prescriptions    No medications on file     No discharge procedures on file      ED Provider  Electronically Signed by           John Montero MD  09/16/18 1739 no

## 2018-09-17 VITALS
DIASTOLIC BLOOD PRESSURE: 96 MMHG | SYSTOLIC BLOOD PRESSURE: 149 MMHG | TEMPERATURE: 97.4 F | RESPIRATION RATE: 16 BRPM | WEIGHT: 135 LBS | HEART RATE: 78 BPM | OXYGEN SATURATION: 98 %

## 2018-09-17 LAB
ANION GAP SERPL CALCULATED.3IONS-SCNC: 5 MMOL/L (ref 4–13)
ANION GAP SERPL CALCULATED.3IONS-SCNC: 9 MMOL/L (ref 4–13)
ANION GAP SERPL CALCULATED.3IONS-SCNC: 9 MMOL/L (ref 4–13)
BILIRUB SERPL-MCNC: 1.33 MG/DL (ref 0.2–1)
BUN SERPL-MCNC: 16 MG/DL (ref 5–25)
BUN SERPL-MCNC: 21 MG/DL (ref 5–25)
BUN SERPL-MCNC: 9 MG/DL (ref 5–25)
CALCIUM SERPL-MCNC: 7.9 MG/DL (ref 8.3–10.1)
CALCIUM SERPL-MCNC: 7.9 MG/DL (ref 8.3–10.1)
CALCIUM SERPL-MCNC: 8.8 MG/DL (ref 8.3–10.1)
CHLORIDE SERPL-SCNC: 105 MMOL/L (ref 100–108)
CHLORIDE SERPL-SCNC: 106 MMOL/L (ref 100–108)
CHLORIDE SERPL-SCNC: 108 MMOL/L (ref 100–108)
CO2 SERPL-SCNC: 24 MMOL/L (ref 21–32)
CO2 SERPL-SCNC: 24 MMOL/L (ref 21–32)
CO2 SERPL-SCNC: 27 MMOL/L (ref 21–32)
CREAT SERPL-MCNC: 1.13 MG/DL (ref 0.6–1.3)
CREAT SERPL-MCNC: 1.14 MG/DL (ref 0.6–1.3)
CREAT SERPL-MCNC: 1.21 MG/DL (ref 0.6–1.3)
GFR SERPL CREATININE-BSD FRML MDRD: 79 ML/MIN/1.73SQ M
GFR SERPL CREATININE-BSD FRML MDRD: 85 ML/MIN/1.73SQ M
GFR SERPL CREATININE-BSD FRML MDRD: 86 ML/MIN/1.73SQ M
GLUCOSE SERPL-MCNC: 100 MG/DL (ref 65–140)
GLUCOSE SERPL-MCNC: 118 MG/DL (ref 65–140)
GLUCOSE SERPL-MCNC: 125 MG/DL (ref 65–140)
GLUCOSE SERPL-MCNC: 159 MG/DL (ref 65–140)
GLUCOSE SERPL-MCNC: 165 MG/DL (ref 65–140)
GLUCOSE SERPL-MCNC: 175 MG/DL (ref 65–140)
GLUCOSE SERPL-MCNC: 184 MG/DL (ref 65–140)
GLUCOSE SERPL-MCNC: 194 MG/DL (ref 65–140)
GLUCOSE SERPL-MCNC: 247 MG/DL (ref 65–140)
GLUCOSE SERPL-MCNC: 256 MG/DL (ref 65–140)
GLUCOSE SERPL-MCNC: 294 MG/DL (ref 65–140)
GLUCOSE SERPL-MCNC: 302 MG/DL (ref 65–140)
GLUCOSE SERPL-MCNC: 326 MG/DL (ref 65–140)
GLUCOSE SERPL-MCNC: 331 MG/DL (ref 65–140)
GLUCOSE SERPL-MCNC: 73 MG/DL (ref 65–140)
GLUCOSE SERPL-MCNC: 94 MG/DL (ref 65–140)
GLUCOSE SERPL-MCNC: 98 MG/DL (ref 65–140)
POTASSIUM SERPL-SCNC: 3.2 MMOL/L (ref 3.5–5.3)
POTASSIUM SERPL-SCNC: 3.7 MMOL/L (ref 3.5–5.3)
POTASSIUM SERPL-SCNC: 3.8 MMOL/L (ref 3.5–5.3)
SODIUM SERPL-SCNC: 137 MMOL/L (ref 136–145)
SODIUM SERPL-SCNC: 139 MMOL/L (ref 136–145)
SODIUM SERPL-SCNC: 141 MMOL/L (ref 136–145)

## 2018-09-17 PROCEDURE — 80048 BASIC METABOLIC PNL TOTAL CA: CPT | Performed by: NURSE PRACTITIONER

## 2018-09-17 PROCEDURE — 99238 HOSP IP/OBS DSCHRG MGMT 30/<: CPT | Performed by: NURSE PRACTITIONER

## 2018-09-17 PROCEDURE — 82948 REAGENT STRIP/BLOOD GLUCOSE: CPT

## 2018-09-17 PROCEDURE — 99285 EMERGENCY DEPT VISIT HI MDM: CPT

## 2018-09-17 PROCEDURE — 36415 COLL VENOUS BLD VENIPUNCTURE: CPT | Performed by: NURSE PRACTITIONER

## 2018-09-17 PROCEDURE — 82247 BILIRUBIN TOTAL: CPT | Performed by: NURSE PRACTITIONER

## 2018-09-17 RX ORDER — INSULIN GLARGINE 100 [IU]/ML
40 INJECTION, SOLUTION SUBCUTANEOUS EVERY MORNING
Status: DISCONTINUED | OUTPATIENT
Start: 2018-09-17 | End: 2018-09-17

## 2018-09-17 RX ORDER — HEPARIN SODIUM 5000 [USP'U]/ML
5000 INJECTION, SOLUTION INTRAVENOUS; SUBCUTANEOUS EVERY 8 HOURS SCHEDULED
Status: DISCONTINUED | OUTPATIENT
Start: 2018-09-17 | End: 2018-09-17 | Stop reason: HOSPADM

## 2018-09-17 RX ADMIN — LEVOTHYROXINE SODIUM 88 MCG: 88 TABLET ORAL at 06:32

## 2018-09-17 RX ADMIN — DEXTROSE AND SODIUM CHLORIDE 250 ML/HR: 5; .45 INJECTION, SOLUTION INTRAVENOUS at 07:31

## 2018-09-17 RX ADMIN — POTASSIUM CHLORIDE 20 MEQ: 149 INJECTION, SOLUTION, CONCENTRATE INTRAVENOUS at 05:39

## 2018-09-17 RX ADMIN — POTASSIUM CHLORIDE 20 MEQ: 149 INJECTION, SOLUTION, CONCENTRATE INTRAVENOUS at 03:26

## 2018-09-17 NOTE — PROGRESS NOTES
Progress Note - Critical Care   Clarke Nam 29 y o  male MRN: [id number]  Unit/Bed#: ED 22 Encounter: [id number]    Assessment/Plan:  1  Diabetic ketoacidosis  · Patient insulin pump sensor malfunctioned, patient is working on having family bring in new sensor  · Will transition to lantus and sliding scale insulin today  · Initiate clear liquids diet, advance as tolerated  · Will discontinue IVF once taking po    2  Hypothyroidism  · Continue synthroid    3  Acute kidney injury, resolved  · Monitor renal inidices, urine output    4  Elevated T bilirubin, will recheck    _____________________________________________________________________    HPI/24hr events:   No events overnight  Medications:    Current Facility-Administered Medications:  chlorhexidine 15 mL Swish & Spit Q12H [facility] LAZARO Vicente    dextrose 5 % and sodium chloride 0 45 % 250 mL/hr Intravenous Continuous Hayley Whitt MD Last Rate: 250 mL/hr (09/17/18 0731)   heparin (porcine) 5,000 Units Subcutaneous [facility] LAZARO Darby    insulin glargine 40 Units Subcutaneous QAM LAZARO Darby    insulin lispro 1-5 Units Subcutaneous TID AC LAZARO Darby    insulin lispro 1-5 Units Subcutaneous HS LAZARO Darby    insulin lispro 13 Units Subcutaneous Daily With Breakfast LAZARO Darby    insulin lispro 13 Units Subcutaneous Daily With Lunch LAZARO Darby    insulin lispro 13 Units Subcutaneous Daily With Kerr LAZARO Roche    insulin regular (HumuLIN R,NovoLIN R) infusion 6 Units/hr Intravenous Continuous Hayley Whitt MD Last Rate: 1 Units/hr (09/17/18 0732)   levothyroxine 88 mcg Oral Early Morning LAZARO Vicente    sodium chloride 1,000 mL Intravenous Once Hayley Whitt MD          dextrose 5 % and sodium chloride 0 45 % 250 mL/hr Last Rate: 250 mL/hr (09/17/18 0731)   insulin regular (HumuLIN R,NovoLIN R) infusion 6 Units/hr Last Rate: 1 Units/hr (09/17/18 1519)         Physical exam:  Vitals:    There is no height or weight on file to calculate BMI  Blood pressure 118/73, pulse 76, temperature 98 °F (36 7 °C), temperature source Oral, resp  rate 18, weight 61 2 kg (135 lb), SpO2 97 %  ,  Temp  Min: 98 °F (36 7 °C)  Max: 98 °F (36 7 °C)  IBW: -88 kg    SpO2: 97 %  SpO2 Activity: At Rest  O2 Device: None (Room air)      Intake/Output Summary (Last 24 hours) at 09/17/18 0758  Last data filed at 09/17/18 3036   Gross per 24 hour   Intake           8737 5 ml   Output              650 ml   Net           8087 5 ml       Invasive/non-invasive ventilation settings:   Respiratory    Lab Data (Last 4 hours)    None         O2/Vent Data (Last 4 hours)    None              Invasive Devices     Peripheral Intravenous Line            Peripheral IV 09/16/18 Left Antecubital less than 1 day    Peripheral IV 09/16/18 Left Hand less than 1 day                  Physical Exam:  Gen: appears in good health, in NAD  HEENT: normocephalic, atraumatic, oropharynx clear  Neck: no JVD, no lymphadenopathy, trachea midline  Chest: lung sounds clear  Cor: audible S1,S2, no edema  Abd: soft, non tender, non distended  Ext: moves all extremities, equally  Neuro: alert and oriented x 3  Skin: warm, dry      Diagnostic Data:  Lab: I have personally reviewed pertinent lab results     CBC:     Results from last 7 days  Lab Units 09/16/18  1749 09/16/18  1309   WBC Thousand/uL  --  11 82*   HEMOGLOBIN g/dL  --  13 2   HEMATOCRIT %  --  41 2   PLATELETS Thousands/uL 209 220       CMP:     Results from last 7 days  Lab Units 09/17/18  0554 09/17/18  0105 09/16/18  1749 09/16/18  1309   SODIUM mmol/L 139 141 138 131*   POTASSIUM mmol/L 3 7 3 2* 3 8 5 0   CHLORIDE mmol/L 106 108 103 90*   CO2 mmol/L 24 24 22 19*   BUN mg/dL 16 21 29* 36*   CREATININE mg/dL 1 13 1 21 1 53* 1 90*   CALCIUM mg/dL 7 9* 7 9* 8 5 9 9   ALK PHOS U/L  --   --  67 75   ALT U/L  --   --  26 24   AST U/L  --   --  18 21     PT/INR:   Lab Results   Component Value Date    INR 1 03 09/16/2018   , Magnesium:     Results from last 7 days  Lab Units 09/16/18  1749 09/16/18  1309   MAGNESIUM mg/dL 2 2 2 4     Phosphorous:     Results from last 7 days  Lab Units 09/16/18  1309   PHOSPHORUS mg/dL 5 9*       Cardiac lab/EKG/telemetry/ECHO:   NSR    VTE Prophylaxis: SCD/heparin    Code Status: Level 1 - Full Code    LAZARO Sanchez    Portions of the record may have been created with voice recognition software  Occasional wrong word or "sound a like" substitutions may have occurred due to the inherent limitations of voice recognition software  Read the chart carefully and recognize, using context, where substitutions have occurred

## 2018-09-17 NOTE — ED NOTES
Spoke with critical care regarding BS, obtained order to decrease insulin to 3 units per hour        Farida Verduzco RN  09/16/18 7717

## 2018-09-17 NOTE — ED NOTES
Simone Shearer admitting CRNP at pts bedside   Per CRNP pt will be D/C after BMP results      Shivani Hernandez RN  09/17/18 1949

## 2018-09-17 NOTE — ED NOTES
Slim at pts bedside  Per slim they will be down to check on pt at 6pm and decided on d/c of pt  Pt has insulin pump on which is recording BS so per admitting they do not need to be rechecked  Informed pt to let staff know if blood sugar increases or decreases rapidly        Tereza Barber RN  09/17/18 4567

## 2018-09-17 NOTE — CASE MANAGEMENT
Initial Clinical Review    Admission: Date/Time/Statement: 9/16/18 @ 1420     Orders Placed This Encounter   Procedures    Inpatient Admission (expected length of stay for this patient is greater than two midnights)     Standing Status:   Standing     Number of Occurrences:   1     Order Specific Question:   Admitting Physician     Answer:   Poppy Salamanca     Order Specific Question:   Level of Care     Answer:   Level 1 Stepdown [13]     Order Specific Question:   Estimated length of stay     Answer:   More than 2 Midnights     Order Specific Question:   Certification     Answer:   I certify that inpatient services are medically necessary for this patient for a duration of greater than two midnights  See H&P and MD Progress Notes for additional information about the patient's course of treatment  ED: Date/Time/Mode of Arrival:   ED Arrival Information     Expected Arrival Acuity Means of Arrival Escorted By Service Admission Type    - 9/16/2018 12:02 Emergent Walk-In Family Member Critical Care/ICU Emergency    Arrival Complaint    chest pain          Chief Complaint:   Chief Complaint   Patient presents with    Palpitations     Patient reports beginning with palpitations at 8am this morning  patient also c/o chest pain, difficulty with deep inspiration, and upper back pain  Denies cardiac hx  Hx of type 1 diabetes  BS controlled with insulin  History of Illness: Baker Osgood is a 29 y o  male with a known history of type 1 diabetes mellitus  Has been under great deal of stress lately with his [employer] business  He has recently began to have palpitations, nausea, cough productive of green phlegm only in the morning  He further complains of decreased appetite and states that he vomited in the x-ray department today    He denies complaints of lightheadedness or dizziness, neck pain, diarrhea, lower extremity edema      He will be admitted to the step-down portion of the intensive care unit  He will require greater than 2 midnight stay  Endocrinology will be consulted on his behalf  Patient normally follows with Dr Christiano Chung, endocr[facility]ologist at [facility]  ED Vital Signs:   ED Triage Vitals [09/16/18 1210]   Temperature Pulse Respirations Blood Pressure SpO2   98 °F (36 7 °C) (!) 116 22 136/63 98 %      Temp Source Heart Rate Source Patient Position - Orthostatic VS BP Location FiO2 (%)   Oral Monitor Lying Right arm --      Pain Score       7        Wt Readings from Last 1 Encounters:   09/16/18 61 2 kg (135 lb)       Vital Signs (abnormal):    above    Abnormal Labs/Diagnostic Test Results:    UDS    +   THC  Phosphorus    5 9  Acetone     bld  2+  Na  131  Chloride   90  CO2    19  AG   22  BUN/Creat   36/1 90  BS  588  Total  Bili   2 12  WBC   11 82  Abs n eutro   8 61    ED Treatment:   Medication Administration from 09/16/2018 1202 to 09/17/2018 1629       Date/Time Order Dose Route Action Action by Comments     09/16/2018 1422 sodium chloride 0 9 % bolus 1,000 mL 1,000 mL Intravenous Not Given Starla Herr RN not given pt started on dka protocol     09/16/2018 1437 sodium chloride 0 9 % bolus 1,000 mL 0 mL Intravenous Stopped Le Wilburn RN      09/16/2018 1337 sodium chloride 0 9 % bolus 1,000 mL 1,000 mL Intravenous [address] Le Wilburn RN      09/17/2018 0334 sodium chloride 0 9 % infusion   Intravenous Canceled Entry Jackeline Rivera RN      09/16/2018 2215 sodium chloride 0 9 % infusion   Intravenous Canceled Entry Crystal Tatum RN      09/16/2018 1423 sodium chloride 0 9 % infusion   Intravenous Canceled Entry Starla Herr RN pt already had 1L nss infused       09/16/2018 1423 sodium chloride 0 9 % infusion   Intravenous Canceled Entry Starla Herr RN order already placed     09/16/2018 2215 sodium chloride 0 9 % infusion 250 mL/hr Intravenous Not Given Crystal Tatum RN      09/16/2018 1847 sodium chloride 0 9 % infusion 0 mL/hr Intravenous Geoff MAZARIEGOS Valrie Nyhan, RN      09/16/2018 1637 sodium chloride 0 9 % infusion 250 mL/hr Intravenous New Bag Eli Baeza RN Next fluid in DKA protocol, given when prior fluids completed       09/17/2018 1033 dextrose 5 % and sodium chloride 0 45 % infusion 0 mL/hr Intravenous Stopped Porfirio Bloodgoalida, RN      09/17/2018 0800 dextrose 5 % and sodium chloride 0 45 % infusion 250 mL/hr Intravenous Rate/Dose Verify Porfirio Bloodgood, RN      09/17/2018 0732 dextrose 5 % and sodium chloride 0 45 % infusion 250 mL/hr Intravenous Rate/Dose Verify Porfirio Bloodgood, RN      09/17/2018 0731 dextrose 5 % and sodium chloride 0 45 % infusion 250 mL/hr Intravenous [address] Porfirio Bloodgood, RN      09/17/2018 0729 dextrose 5 % and sodium chloride 0 45 % infusion 0 mL/hr Intravenous Stopped Porfirio Bloodpascual, RN      09/16/2018 1847 dextrose 5 % and sodium chloride 0 45 % infusion 250 mL/hr Intravenous [address] Eli Baeza, RN      09/17/2018 1322 insulin regular (HumuLIN R,NovoLIN R) 1 Units/mL in sodium chloride 0 9 % 100 mL infusion 0 Units/hr Intravenous Stopped Arthur Arreaga, LOVE      09/17/2018 1030 insulin regular (HumuLIN R,NovoLIN R) 1 Units/mL in sodium chloride 0 9 % 100 mL infusion 2 Units/hr Intravenous Rate/Dose Change Porfirio Bloodpascual, RN      09/17/2018 0732 insulin regular (HumuLIN R,NovoLIN R) 1 Units/mL in sodium chloride 0 9 % 100 mL infusion 1 Units/hr Intravenous Rate/Dose Change Porfirio Bloodgoalida, RN      09/17/2018 0434 insulin regular (HumuLIN R,NovoLIN R) 1 Units/mL in sodium chloride 0 9 % 100 mL infusion 1 Units/hr Intravenous Rate/Dose Change Zenia Henderson, LOVE      09/17/2018 0330 insulin regular (HumuLIN R,NovoLIN R) 1 Units/mL in sodium chloride 0 9 % 100 mL infusion 0 5 Units/hr Intravenous Rate/Dose Change Azalia Jackson RN      09/17/2018 0058 insulin regular (HumuLIN R,NovoLIN R) 1 Units/mL in sodium chloride 0 9 % 100 mL infusion 1 Units/hr Intravenous Rate/Dose Change Azalia Jackson RN      09/16/2018 1736 insulin regular (HumuLIN R,NovoLIN R) 1 Units/mL in sodium chloride 0 9 % 100 mL infusion 6 Units/hr Intravenous Rate/Dose Change Rickie Emanuel RN      09/16/2018 1538 insulin regular (HumuLIN R,NovoLIN R) 1 Units/mL in sodium chloride 0 9 % 100 mL infusion 3 Units/hr Intravenous Rate/Dose Change Rickie Emanuel RN Rate changed per protocol     09/16/2018 1428 insulin regular (HumuLIN R,NovoLIN R) 1 Units/mL in sodium chloride 0 9 % 100 mL infusion 6 Units/hr Intravenous [address] Chance Dos Santos RN      09/17/2018 0948 chlorhexidine (PERIDEX) 0 12 % oral rinse 15 mL 15 mL Swish & Spit Not Given Annie Jackson RN      09/16/2018 2114 chlorhexidine (PERIDEX) 0 12 % oral rinse 15 mL 15 mL Swish & Spit Given Abdiel Baires RN      09/16/2018 1720 enoxaparin (LOVENOX) subcutaneous injection 40 mg 40 mg Subcutaneous Given Rickie Emanuel RN Received from Pharmacy     09/17/2018 3940 levothyroxine tablet 88 mcg 88 mcg Oral Given Amanda Ortiz RN      09/16/2018 1726 levothyroxine tablet 88 mcg 88 mcg Oral Given Rickie Emanuel RN Received from Pharmacy     09/17/2018 6587 heparin (porcine) subcutaneous injection 5,000 Units 5,000 Units Subcutaneous Not Given Amanda Ortiz RN      09/17/2018 0122 heparin (porcine) subcutaneous injection 5,000 Units 0 Units Subcutaneous Hold Gregg York RN Already received enoxaparin     09/17/2018 0535 potassium chloride 20 mEq in dextrose 5 % 100 mL IVPB 0 mEq Intravenous Stopped Amanda Ortiz RN      09/17/2018 0326 potassium chloride 20 mEq in dextrose 5 % 100 mL IVPB 20 mEq Intravenous [address] Gregg York RN      09/17/2018 0739 potassium chloride 20 mEq in dextrose 5 % 100 mL IVPB 0 mEq Intravenous Stopped Annie Jackson RN      09/17/2018 0539 potassium chloride 20 mEq in dextrose 5 % 100 mL IVPB 20 mEq Intravenous [address] Amanda Ortiz RN      09/17/2018 0948 insulin glargine (LANTUS) subcutaneous injection 40 Units 0 4 mL 40 Units Subcutaneous Not Given Shanique Barry Reyna RN Hold per Dr Coles Post  Continue Insulin  gtt      09/17/2018 0948 insulin lispro (HumaLOG) 100 units/mL subcutaneous injection 13 Units 13 Units Subcutaneous Not Given Jo Betancourt RN      09/17/2018 0948 insulin lispro (HumaLOG) 100 units/mL subcutaneous injection 1-5 Units 1 Units Subcutaneous Not Given Jo Betancourt RN           Past Medical/Surgical History: Active Ambulatory Problems     Diagnosis Date Noted    No Active Ambulatory Problems     Resolved Ambulatory Problems     Diagnosis Date Noted    No Resolved Ambulatory Problems     Past Medical History:   Diagnosis Date    Diabetes mellitus ([facility] [address] )     Disease of thyroid gland        Admitting Diagnosis: Palpitations [R00 2]    Age/Sex: 29 y o  male    1  Assessment/Plan: Diabetic ketoacidosis  · Discontinue patient's pump start IV insulin  · Fluid resuscitation  · Consult endocrine  2  Hypothyroid  · Continue Synthroid  3  Acute kidney injury likely secondary to dehydration  · Monitor kidney indices  · Fluid resuscitation       Admission Orders:   IP  9/16  @  1420  Scheduled Meds:   Current Facility-Administered Medications:  heparin (porcine) 5,000 Units Subcutaneous [facility] LAZARO Rutledge   levothyroxine 88 mcg Oral Early Morning [place] LAZARO Spears   sodium chloride 1,000 mL Intravenous Once Radha Howard MD     Continuous Infusions:    PRN Meds:     Insulin drip -  D/c    9/17  @      1322    Cons  Endocrine  CCD diet  Start  SSI  9/17    PROGRESS  NOTE  9/17  2  Diabetic ketoacidosis  · Patient insulin pump sensor malfunctioned, patient is working on having family bring in new sensor  · Will transition to lantus and sliding scale insulin today  · Initiate clear liquids diet, advance as tolerated  · Will discontinue IVF once taking po     3  Hypothyroidism  · Continue synthroid     4   Acute kidney injury, resolved  · Monitor renal inidices, urine output     4  Elevated T bilirubin, will recheck    Thank you,  [id number] [employer]  Network Utilization Review Department  Phone: [phone]; Fax [phone]  ATTENTION: Please call with any questions or concerns to [phone]  and carefully follow the prompts so that you are directed to the right person  Send all requests for admission clinical reviews, approved or denied determinations and any other requests to fax [phone]   All voicemails are confidential

## 2018-09-17 NOTE — ED NOTES
[facility] will [facility] care        Doug Rutledge RN  09/16/18 7408

## 2018-09-18 NOTE — CASE MANAGEMENT
ADDITIONAL CLINICAL  INFORMATION    UA    >   160  Ketone    ABG    Ph   7 311        PCO2    32 7       PO2   64 5        HCO3  16 1     O2  hmgb   90 0    BS     Drawn  Q 1 hr    Electrolytes  Drawn  Q shift      Thank you,  [address]  Utilization Review Department  Phone: [phone]; Fax [phone]  ATTENTION: Please call with any questions or concerns to [phone]  and carefully follow the prompts so that you are directed to the right person  Send all requests for admission clinical reviews, approved or denied determinations and any other requests to fax [phone]   All voicemails are confidential

## 2018-09-18 NOTE — DISCHARGE SUMMARY
Discharge Summary   Rian Levine 29 y o  male MRN: [id number]  Unit/Bed#: ED 22 Encounter: [id number]    Admission Date: 9/16/2018     Discharge Date: 9/17/2018    Admitting Diagnoses:   1  Diabetic ketoacidosis  2  Acute kidney injury    Discharge Diagnoses:  1  DKA, resolved      Procedures Performed:   Orders Placed This Encounter   Procedures   [facility]       Hospital Course:   Patient admitted yesterday with complaints of nausea, palpitations, productive cough with green sputum that she states has now resolved  He was under a significant amount of stress in the recent past and was not paying attention to the pump warnings, patient noted the sensor on his pump had not been charged and his blood sugars had become significantly higher than normal  He was found to be in diabetic ketoacidosis with an initial glucose of 588 on arrival, an anion gap of 22, bicarb of 19, +2 serum acetone  He was treated with the fluid resuscitation and insulin per the DKA protocol  His symptoms have resolved, his gap closed and his bicarb normalized  His pump is functioning and reading a glucose of 132 upon discharge  He relates he has an appointment set up with Dr Yogi Ayala from Endocrinology  Significant Findings, Care, Treatment and Services Provided:   Ag-22, Bicarbonate-19, Serum acetone +2, [id number]    Complications:   none    Condition at Discharge: good     Discharge instructions/Information to patient and family:   See after visit summary for information provided to patient and family  Provisions for Follow-Up Care:  See after visit summary for information related to follow-up care and any pertinent home health orders  Disposition: Home    Discharge Statement   I spent 15 minutes discharging the patient  This time was spent on the day of discharge  I had direct contact with the patient on the day of discharge   Additional documentation is required if more than 30 minutes were spent on discharge  Discharge Medications:  See after visit summary for reconciled discharge medications provided to patient and family

## 2018-12-24 DIAGNOSIS — N18.9 CHRONIC RENAL FAILURE, UNSPECIFIED CKD STAGE: ICD-10-CM

## 2018-12-24 DIAGNOSIS — E03.9 HYPOTHYROIDISM, UNSPECIFIED TYPE: Primary | ICD-10-CM

## 2018-12-24 RX ORDER — LISINOPRIL 10 MG/1
10 TABLET ORAL DAILY
Qty: 90 TABLET | Refills: 3 | Status: SHIPPED | OUTPATIENT
Start: 2018-12-24 | End: 2019-09-23 | Stop reason: SDUPTHER

## 2018-12-24 RX ORDER — LEVOTHYROXINE SODIUM 88 UG/1
88 TABLET ORAL DAILY
Qty: 90 TABLET | Refills: 3 | Status: SHIPPED | OUTPATIENT
Start: 2018-12-24 | End: 2019-09-23 | Stop reason: SDUPTHER

## 2019-08-05 DIAGNOSIS — E10.3553 TYPE 1 DIABETES MELLITUS WITH STABLE PROLIFERATIVE RETINOPATHY OF BOTH EYES (HCC): Primary | ICD-10-CM

## 2019-09-23 DIAGNOSIS — E10.3553 TYPE 1 DIABETES MELLITUS WITH STABLE PROLIFERATIVE RETINOPATHY OF BOTH EYES (HCC): Primary | ICD-10-CM

## 2019-09-23 DIAGNOSIS — E03.9 HYPOTHYROIDISM, UNSPECIFIED TYPE: ICD-10-CM

## 2019-09-23 DIAGNOSIS — N18.9 CHRONIC RENAL FAILURE, UNSPECIFIED CKD STAGE: ICD-10-CM

## 2019-09-23 RX ORDER — LEVOTHYROXINE SODIUM 88 UG/1
88 TABLET ORAL DAILY
Qty: 90 TABLET | Refills: 3 | Status: SHIPPED | OUTPATIENT
Start: 2019-09-23 | End: 2019-09-30 | Stop reason: SDUPTHER

## 2019-09-23 RX ORDER — LISINOPRIL 10 MG/1
10 TABLET ORAL DAILY
Qty: 90 TABLET | Refills: 3 | Status: SHIPPED | OUTPATIENT
Start: 2019-09-23 | End: 2020-09-19 | Stop reason: SDUPTHER

## 2019-09-30 DIAGNOSIS — E03.9 HYPOTHYROIDISM, UNSPECIFIED TYPE: ICD-10-CM

## 2019-10-01 RX ORDER — LEVOTHYROXINE SODIUM 88 UG/1
TABLET ORAL
Qty: 90 TABLET | Refills: 0 | Status: SHIPPED | OUTPATIENT
Start: 2019-10-01 | End: 2020-05-10 | Stop reason: SDUPTHER

## 2020-03-08 DIAGNOSIS — E10.3553 TYPE 1 DIABETES MELLITUS WITH STABLE PROLIFERATIVE RETINOPATHY OF BOTH EYES (HCC): ICD-10-CM

## 2020-05-09 DIAGNOSIS — E10.3553 TYPE 1 DIABETES MELLITUS WITH STABLE PROLIFERATIVE RETINOPATHY OF BOTH EYES (HCC): ICD-10-CM

## 2020-05-10 DIAGNOSIS — E03.9 HYPOTHYROIDISM, UNSPECIFIED TYPE: ICD-10-CM

## 2020-05-10 RX ORDER — LEVOTHYROXINE SODIUM 88 UG/1
88 TABLET ORAL EVERY MORNING
Qty: 30 TABLET | Refills: 0 | Status: SHIPPED | OUTPATIENT
Start: 2020-05-10 | End: 2020-06-09 | Stop reason: SDUPTHER

## 2020-06-09 DIAGNOSIS — N18.9 CHRONIC RENAL FAILURE, UNSPECIFIED CKD STAGE: ICD-10-CM

## 2020-06-09 DIAGNOSIS — E03.9 HYPOTHYROIDISM, UNSPECIFIED TYPE: ICD-10-CM

## 2020-06-09 RX ORDER — LEVOTHYROXINE SODIUM 88 UG/1
88 TABLET ORAL EVERY MORNING
Qty: 30 TABLET | Refills: 1 | Status: SHIPPED | OUTPATIENT
Start: 2020-06-09 | End: 2020-09-19 | Stop reason: SDUPTHER

## 2020-09-19 DIAGNOSIS — E03.9 HYPOTHYROIDISM, UNSPECIFIED TYPE: ICD-10-CM

## 2020-09-19 DIAGNOSIS — N18.9 CHRONIC RENAL FAILURE, UNSPECIFIED CKD STAGE: ICD-10-CM

## 2020-09-19 RX ORDER — LISINOPRIL 10 MG/1
10 TABLET ORAL DAILY
Qty: 90 TABLET | Refills: 3 | Status: SHIPPED | OUTPATIENT
Start: 2020-09-19

## 2020-09-19 RX ORDER — LEVOTHYROXINE SODIUM 88 UG/1
88 TABLET ORAL EVERY MORNING
Qty: 90 TABLET | Refills: 3 | Status: SHIPPED | OUTPATIENT
Start: 2020-09-19

## 2021-03-12 ENCOUNTER — TELEPHONE (OUTPATIENT)
Dept: OBGYN CLINIC | Facility: HOSPITAL | Age: 36
End: 2021-03-12

## 2021-03-12 NOTE — TELEPHONE ENCOUNTER
Patient father will be faxing surgical records to the [place] office regarding the leg for review   He is looking for an Orthopedic Trauma specialist      # [phone]

## 2021-03-16 NOTE — TELEPHONE ENCOUNTER
Patients father is calling back to schedule an appt for his left leg  It looks like we did get the records, can I schedule?     # [phone]

## 2021-03-22 ENCOUNTER — TELEPHONE (OUTPATIENT)
Dept: OBGYN CLINIC | Facility: HOSPITAL | Age: 36
End: 2021-03-22

## 2021-03-22 NOTE — TELEPHONE ENCOUNTER
Spoke to [employer] about his appointment with Dr Sherif Dallas Rosalse now has [employer] my direct blue EPO which we are non par with  He had surgery at [facility] and mom could not bring him there  Sin recommended us but we are non par with his insurance  I am canceling his appointment  Called mom back to recommend [place] ortho doctor, Veryl Boast

## 2021-03-24 DIAGNOSIS — E10.3553 TYPE 1 DIABETES MELLITUS WITH STABLE PROLIFERATIVE RETINOPATHY OF BOTH EYES (HCC): ICD-10-CM

## 2021-03-31 DIAGNOSIS — Z23 ENCOUNTER FOR IMMUNIZATION: ICD-10-CM
